# Patient Record
Sex: FEMALE | Race: OTHER
[De-identification: names, ages, dates, MRNs, and addresses within clinical notes are randomized per-mention and may not be internally consistent; named-entity substitution may affect disease eponyms.]

---

## 2018-06-26 ENCOUNTER — HOSPITAL ENCOUNTER (EMERGENCY)
Dept: HOSPITAL 72 - EMR | Age: 43
Discharge: HOME | End: 2018-06-26
Payer: SELF-PAY

## 2018-06-26 VITALS — SYSTOLIC BLOOD PRESSURE: 112 MMHG | DIASTOLIC BLOOD PRESSURE: 72 MMHG

## 2018-06-26 VITALS — BODY MASS INDEX: 29.27 KG/M2 | WEIGHT: 155 LBS | HEIGHT: 61 IN

## 2018-06-26 DIAGNOSIS — J45.909: ICD-10-CM

## 2018-06-26 DIAGNOSIS — N39.0: Primary | ICD-10-CM

## 2018-06-26 LAB
APPEARANCE UR: (no result)
APTT PPP: (no result) S
GLUCOSE UR STRIP-MCNC: NEGATIVE MG/DL
KETONES UR QL STRIP: (no result)
LEUKOCYTE ESTERASE UR QL STRIP: (no result)
NITRITE UR QL STRIP: NEGATIVE
PH UR STRIP: 5 [PH] (ref 4.5–8)
PROT UR QL STRIP: (no result)
SP GR UR STRIP: 1.02 (ref 1–1.03)
UROBILINOGEN UR-MCNC: 1 MG/DL (ref 0–1)

## 2018-06-26 PROCEDURE — 81003 URINALYSIS AUTO W/O SCOPE: CPT

## 2018-06-26 PROCEDURE — 81025 URINE PREGNANCY TEST: CPT

## 2018-06-26 PROCEDURE — 99284 EMERGENCY DEPT VISIT MOD MDM: CPT

## 2018-06-26 PROCEDURE — 87086 URINE CULTURE/COLONY COUNT: CPT

## 2018-06-26 NOTE — EMERGENCY ROOM REPORT
History of Present Illness


General


Chief Complaint:  General Complaint


Source:  Patient





Present Illness


HPI


42-year-old female presents emergency department complaining of urinary 

frequency, nausea vomiting, breast tenderness and positive urine pregnancy at 

home.  Patient states that she just had her period 3 weeks ago she states she 

has not entered menopause yet.  Patient denies recent sexual activities.  

Patient also reports lower abdominal distention.  Patient states she also has a 

history of hemorrhoids and she believes she currently has one because she 

noticed some bright red blood on the paper after having a bowel movement x2.  

Patient denies itching or pain in the rectal area she does report some 

"fullness."  Patient denies fevers, chills, abdominal tenderness.  Patient 

denies black tarry stools or blood in the vomit. Denies constipation or diarrhea

, reports stool has been normal.  Patient estimates that she vomited one time 

yesterday however she has been nauseated for approximately 3 days. Pt. reports 

having low back aches. Pt. denies rashes, erythema, or blisters on the breasts. 

pt. reports her breasts feel " bowman, and heavier." denies nipple d/c,or dry 

cracked skin.


Allergies:  


Coded Allergies:  


     No Known Allergies (Unverified , 13)





Patient History


Past Medical History:  see triage record


Past Surgical History:  none


Pertinent Family History:  none


Last Menstrual Period:  may 5


:  8


Para:  2


Reviewed Nursing Documentation:  PMH: Agreed; PSxH: Agreed





Nursing Documentation-PMH


Past Medical History:  No History, Except For


Hx Cardiac Problems:  No


Hx Asthma:  Yes


Hx Cancer:  No


Hx Gastrointestinal Problems:  No


Hx Neurological Problems:  No





Review of Systems


All Other Systems:  negative except mentioned in HPI





Physical Exam





Vital Signs








  Date Time  Temp Pulse Resp B/P (MAP) Pulse Ox O2 Delivery O2 Flow Rate FiO2


 


18 14:59 98.5 114 18 112/72 98 Room Air  





 98.4       








Sp02 EP Interpretation:  reviewed, normal


General Appearance:  no apparent distress, alert, GCS 15, non-toxic


Head:  normocephalic, atraumatic


ENT:  hearing grossly normal, normal voice


Neck:  full range of motion


Respiratory:  lungs clear, normal breath sounds, no wheezing, speaking full 

sentences


Cardiovascular #1:  normal capillary refill, tachycardia


Gastrointestinal:  normal bowel sounds, non tender, soft, non-distended, no 

guarding


Rectal:  hemorrhoids - hemorrhoid at the 5 o'Clock position that is not 

thrombosed.


Genitourinary:  no CVA tenderness


Musculoskeletal:  back normal, gait/station normal, normal range of motion, non-

tender


Neurologic:  alert, oriented x3, responsive, motor strength/tone normal, 

sensory intact, normal gait, speech normal, grossly normal


Psychiatric:  judgement/insight normal, other - pt. very talkative with rapid 

speech.


Skin:  normal color, no rash, warm/dry, well hydrated





Medical Decision Making


PA Attestation


Dr. Wallace  is my supervising Physician whom patient management has been 

discussed with.


Diagnostic Impression:  


 Primary Impression:  


 Urinary tract infection


 Qualified Codes:  N30.00 - Acute cystitis without hematuria


ER Course


42-year-old female presents emergency department complaining of urinary 

frequency, nausea vomiting, breast tenderness and positive urine pregnancy at 

home.  Patient states that she just had her period 3 weeks ago she states she 

has not entered menopause yet.  Patient denies recent sexual activities.  

Patient also reports lower abdominal distention.  Patient states she also has a 

history of hemorrhoids and she believes she currently has one because she 

noticed some bright red blood on the paper after having a bowel movement x2.  

Patient denies itching or pain in the rectal area she does report some 

"fullness."  Patient denies fevers, chills, abdominal tenderness.  Patient 

denies black tarry stools or blood in the vomit. Denies constipation or diarrhea

, reports stool has been normal.  Patient estimates that she vomited one time 

yesterday however she has been nauseated for approximately 3 days. Pt. reports 

having low back aches. Pt. denies rashes, erythema, or blisters on the breasts. 

pt. reports her breasts feel " bowman, and heavier." denies nipple d/c,or dry 

cracked skin.  





Ddx considered but are not limited to UTi , Pyelo, STI, Stone, Cystitis, 

pregnancy, hemorrhoids, GI bleed just to name a few. 


Vital signs: Tachycardic,  pt. is afebrile 





H&PE are most consistent with UTI - no CVA tenderness, pt. does have hemorrhoid 

at the 5 o'Clock position that is not thrombosed.  Pt. has good cap refill and 

does not have pale palms or nail beds. 





ORDERS:


- UA labs are attached : Positive for urinary infection


-Urine HCG: Negative





ED INTERVENTIONS:  None required at this time. 





DISCHARGE: At this time pt. is stable for d/c to home. Will provide printed 

patient care instructions, and any necessary prescriptions. Care plan and 

follow up instructions have been discussed with the patient prior to discharge.





Labs








Test


  18


15:00


 


Urine Color Aura 


 


Urine Appearance Cloudy 


 


Urine pH 5 (4.5-8.0) 


 


Urine Specific Gravity


  1.025


(1.005-1.035)


 


Urine Protein 2+ (NEGATIVE) 


 


Urine Glucose (UA)


  Negative


(NEGATIVE)


 


Urine Ketones 1+ (NEGATIVE) 


 


Urine Occult Blood 2+ (NEGATIVE) 


 


Urine Nitrite


  Negative


(NEGATIVE)


 


Urine Bilirubin


  Negative


(NEGATIVE)


 


Urine Ictotest Negative 


 


Urine Urobilinogen


  1 MG/DL


(0.0-1.0)


 


Urine Leukocyte Esterase 1+ (NEGATIVE) 


 


Urine RBC


  0-2 /HPF (0 -


2)


 


Urine WBC


  10-15 /HPF (0


- 2)


 


Urine Squamous Epithelial


Cells Many /LPF


(NONE/OCC)


 


Urine Bacteria


  Moderate /HPF


(NONE)


 


Urine HCG, Qualitative


  Negative


(NEGATIVE)











Last Vital Signs








  Date Time  Temp Pulse Resp B/P (MAP) Pulse Ox O2 Delivery O2 Flow Rate FiO2


 


18 15:49 98.4 103 18 112/72 98 Room Air  





 98.4       








Disposition:  HOME, SELF-CARE


Condition:  Stable


Scripts


Phenazopyridine Hcl* (PYRIDIUM*) 200 Mg Tablet


200 MG ORAL THREE TIMES A DAY for 3 Days, #9 TAB 0 Refills


   Prov: Brenda Serrano         18 


Trimethoprim/Sulfamethoxazole 160/800* (BACTRIM DS TABLET*) 1 Each Tablet


1 TAB ORAL TWICE A DAY for 7 Days, #14 TAB


   Prov: Brenda Serrano         18


Referrals:  


NOT CHOSEN [facility]/MD,REFERRING (PCP)


Patient Instructions:  Urinary Tract Infection, Easy-to-Read





Additional Instructions:  


Take medications as directed. 


 ** Follow up with a Primary Care Provider in 3-5 days, even if your symptoms 

have resolved. ** 


--Please review list of primary care clinics, if you do not already have a 

primary care provider





Return sooner to ED if new symptoms occur, or current symptoms become worse. 











- Please note that this Emergency Department Report was dictated using [employer] technology software, occasionally this can lead to 

erroneous entry secondary to interpretation by the dictation equipment.











Brenda Serrano 2018 16:06